# Patient Record
Sex: MALE | Race: WHITE | Employment: OTHER | URBAN - METROPOLITAN AREA
[De-identification: names, ages, dates, MRNs, and addresses within clinical notes are randomized per-mention and may not be internally consistent; named-entity substitution may affect disease eponyms.]

---

## 2017-02-23 ENCOUNTER — HOSPITAL ENCOUNTER (EMERGENCY)
Facility: HOSPITAL | Age: 62
Discharge: HOME | End: 2017-02-23
Attending: EMERGENCY MEDICINE
Payer: MEDICAID

## 2017-02-23 ENCOUNTER — APPOINTMENT (OUTPATIENT)
Dept: GENERAL RADIOLOGY | Facility: HOSPITAL | Age: 62
End: 2017-02-23
Attending: EMERGENCY MEDICINE
Payer: MEDICAID

## 2017-02-23 VITALS
SYSTOLIC BLOOD PRESSURE: 124 MMHG | RESPIRATION RATE: 18 BRPM | OXYGEN SATURATION: 100 % | TEMPERATURE: 98 F | DIASTOLIC BLOOD PRESSURE: 72 MMHG | HEART RATE: 65 BPM

## 2017-02-23 DIAGNOSIS — M70.51 BURSITIS OF KNEE, RIGHT: Primary | ICD-10-CM

## 2017-02-23 PROCEDURE — 73562 X-RAY EXAM OF KNEE 3: CPT

## 2017-02-23 PROCEDURE — 99283 EMERGENCY DEPT VISIT LOW MDM: CPT

## 2017-02-23 RX ORDER — TRAMADOL HYDROCHLORIDE 50 MG/1
TABLET ORAL EVERY 4 HOURS PRN
Qty: 20 TABLET | Refills: 0 | Status: SHIPPED | OUTPATIENT
Start: 2017-02-23 | End: 2017-03-02

## 2017-02-23 RX ORDER — HYDROCODONE BITARTRATE AND ACETAMINOPHEN 5; 325 MG/1; MG/1
2 TABLET ORAL ONCE
Status: COMPLETED | OUTPATIENT
Start: 2017-02-23 | End: 2017-02-23

## 2017-02-23 NOTE — ED PROVIDER NOTES
Patient Seen in: [facility] AND [facility] Emergency Department    History   Patient presents with:  Musculoskeletal Problem    Stated Complaint: right knee pain     HPI    49-year-old male with history of coronary artery disease presents with complaints of righ auscultation  Cardiac: regular rate and rhythm  Musculoskeletal: neck is supple and non tender         Small effusion noted to the right knee. No erythema or warmth noted. There is tenderness to palpation to the patella.   There is tenderness with flexion

## 2017-02-25 ENCOUNTER — HOSPITAL ENCOUNTER (EMERGENCY)
Facility: HOSPITAL | Age: 62
Discharge: HOME | End: 2017-02-25
Attending: EMERGENCY MEDICINE
Payer: MEDICAID

## 2017-02-25 VITALS
BODY MASS INDEX: 28.56 KG/M2 | TEMPERATURE: 99 F | RESPIRATION RATE: 20 BRPM | SYSTOLIC BLOOD PRESSURE: 113 MMHG | WEIGHT: 204 LBS | HEART RATE: 66 BPM | OXYGEN SATURATION: 97 % | DIASTOLIC BLOOD PRESSURE: 64 MMHG | HEIGHT: 71 IN

## 2017-02-25 DIAGNOSIS — M13.161 INFLAMMATION OF JOINT OF RIGHT KNEE: Primary | ICD-10-CM

## 2017-02-25 LAB
BASOPHILS NFR FLD: 0 %
EOSINOPHIL NFR FLD: 4 %
LYMPHOCYTES NFR FLD: 21 %
MONOCYTES NFR FLD: 20 %
NEUTROPHILS NFR FLD: 55 %
RBC # FLD: ABNORMAL /CUMM (ref ?–1)
WBC # FLD: 528 /CUMM

## 2017-02-25 PROCEDURE — 0S9C3ZZ DRAINAGE OF RIGHT KNEE JOINT, PERCUTANEOUS APPROACH: ICD-10-PCS | Performed by: EMERGENCY MEDICINE

## 2017-02-25 PROCEDURE — 87070 CULTURE OTHR SPECIMN AEROBIC: CPT | Performed by: EMERGENCY MEDICINE

## 2017-02-25 PROCEDURE — 20610 DRAIN/INJ JOINT/BURSA W/O US: CPT

## 2017-02-25 PROCEDURE — 87205 SMEAR GRAM STAIN: CPT | Performed by: EMERGENCY MEDICINE

## 2017-02-25 PROCEDURE — 99284 EMERGENCY DEPT VISIT MOD MDM: CPT

## 2017-02-25 PROCEDURE — 89050 BODY FLUID CELL COUNT: CPT | Performed by: EMERGENCY MEDICINE

## 2017-02-25 PROCEDURE — 89051 BODY FLUID CELL COUNT: CPT | Performed by: EMERGENCY MEDICINE

## 2017-02-25 PROCEDURE — 96372 THER/PROPH/DIAG INJ SC/IM: CPT

## 2017-02-25 RX ORDER — CEPHALEXIN 500 MG/1
500 CAPSULE ORAL 4 TIMES DAILY
Qty: 28 CAPSULE | Refills: 0 | Status: SHIPPED | OUTPATIENT
Start: 2017-02-25 | End: 2017-03-04

## 2017-02-25 RX ORDER — HYDROCODONE BITARTRATE AND ACETAMINOPHEN 5; 325 MG/1; MG/1
1-2 TABLET ORAL EVERY 6 HOURS PRN
Qty: 12 TABLET | Refills: 0 | Status: SHIPPED | OUTPATIENT
Start: 2017-02-25

## 2017-02-25 RX ORDER — KETOROLAC TROMETHAMINE 30 MG/ML
60 INJECTION, SOLUTION INTRAMUSCULAR; INTRAVENOUS ONCE
Status: COMPLETED | OUTPATIENT
Start: 2017-02-25 | End: 2017-02-25

## 2017-02-25 RX ORDER — MORPHINE SULFATE 4 MG/ML
4 INJECTION, SOLUTION INTRAMUSCULAR; INTRAVENOUS ONCE
Status: COMPLETED | OUTPATIENT
Start: 2017-02-25 | End: 2017-02-25

## 2017-02-25 RX ORDER — PREDNISONE 20 MG/1
60 TABLET ORAL ONCE
Status: COMPLETED | OUTPATIENT
Start: 2017-02-25 | End: 2017-02-25

## 2017-02-25 RX ORDER — PREDNISONE 20 MG/1
40 TABLET ORAL DAILY
Qty: 10 TABLET | Refills: 0 | Status: SHIPPED | OUTPATIENT
Start: 2017-02-25 | End: 2017-03-02

## 2017-02-25 RX ORDER — CEPHALEXIN 500 MG/1
500 CAPSULE ORAL ONCE
Status: COMPLETED | OUTPATIENT
Start: 2017-02-25 | End: 2017-02-25

## 2017-02-26 NOTE — ED INITIAL ASSESSMENT (HPI)
In this ED on Thursday for right knee pain. Dx with bursitis. Pain worse today.  Unable to bear weight

## 2017-02-26 NOTE — ED NOTES
Pt was in the ED on Thursday for R sided knee pain. States that he was diagnosed with bursitis and told to follow up with his PCP.  PCP is unable to see pt until Monday PM, but pt states that it is getting today worse, decreased mobility and increased swell

## 2017-02-26 NOTE — ED PROVIDER NOTES
Patient Seen in: [facility] AND [facility] Emergency Department    History   Patient presents with:  Pain (neurologic)      HPI    Patient presents complaining of right knee pain for the past 5 days.   He states that she was seen here several days ago and diagnos ED Triage Vitals   BP 02/25/17 1908 114/67 mmHg   Pulse 02/25/17 1908 66   Resp 02/25/17 1908 18   Temp 02/25/17 1908 98.7 °F (37.1 °C)   Temp src 02/25/17 1908 Oral   SpO2 02/25/17 1908 98 %   O2 Device 02/25/17 1908 None (Room air)       Physical E arthritis on synovial fluid analysis. Likely inflammatory process with possible anterior cellulitis. Will prescribe prednisone for anti-inflammation and discharge patient on Keflex in case of early infectious process.   Strongly encouraged orthopedic foll

## 2017-08-09 ENCOUNTER — MYAURORA ACCOUNT LINK (OUTPATIENT)
Dept: OTHER | Age: 62
End: 2017-08-09

## 2018-02-21 ENCOUNTER — MYAURORA ACCOUNT LINK (OUTPATIENT)
Dept: OTHER | Age: 63
End: 2018-02-21

## 2022-06-16 ENCOUNTER — APPOINTMENT (OUTPATIENT)
Dept: GENERAL RADIOLOGY | Age: 67
End: 2022-06-16
Payer: MEDICAID

## 2022-06-16 ENCOUNTER — HOSPITAL ENCOUNTER (OUTPATIENT)
Age: 67
Discharge: HOME | End: 2022-06-16
Payer: MEDICAID

## 2022-06-16 VITALS
OXYGEN SATURATION: 100 % | HEART RATE: 79 BPM | TEMPERATURE: 99 F | RESPIRATION RATE: 22 BRPM | BODY MASS INDEX: 27.3 KG/M2 | DIASTOLIC BLOOD PRESSURE: 70 MMHG | WEIGHT: 195 LBS | HEIGHT: 71 IN | SYSTOLIC BLOOD PRESSURE: 114 MMHG

## 2022-06-16 DIAGNOSIS — T14.90XA TRAUMA: Primary | ICD-10-CM

## 2022-06-16 DIAGNOSIS — L03.114 CELLULITIS OF LEFT UPPER EXTREMITY: ICD-10-CM

## 2022-06-16 DIAGNOSIS — M19.90 ARTHRITIS: ICD-10-CM

## 2022-06-16 PROCEDURE — 29105 APPLICATION LONG ARM SPLINT: CPT

## 2022-06-16 PROCEDURE — 73110 X-RAY EXAM OF WRIST: CPT

## 2022-06-16 PROCEDURE — 73130 X-RAY EXAM OF HAND: CPT

## 2022-06-16 PROCEDURE — 73090 X-RAY EXAM OF FOREARM: CPT

## 2022-06-16 PROCEDURE — 73080 X-RAY EXAM OF ELBOW: CPT

## 2022-06-16 PROCEDURE — 99204 OFFICE O/P NEW MOD 45 MIN: CPT

## 2022-06-16 RX ORDER — CEPHALEXIN 500 MG/1
500 CAPSULE ORAL 4 TIMES DAILY
Qty: 40 CAPSULE | Refills: 0 | Status: SHIPPED | OUTPATIENT
Start: 2022-06-16 | End: 2022-06-26

## 2022-06-16 RX ORDER — PANTOPRAZOLE SODIUM 40 MG/1
TABLET, DELAYED RELEASE ORAL
COMMUNITY
Start: 2022-03-01

## 2022-06-16 RX ORDER — DIGOXIN 125 MCG
TABLET ORAL
COMMUNITY
Start: 2022-05-27

## 2022-06-16 RX ORDER — SACUBITRIL AND VALSARTAN 97; 103 MG/1; MG/1
TABLET, FILM COATED ORAL
COMMUNITY
Start: 2022-06-01

## 2022-06-16 RX ORDER — EZETIMIBE 10 MG/1
TABLET ORAL
COMMUNITY
Start: 2022-05-06

## 2022-06-16 RX ORDER — CLOPIDOGREL BISULFATE 75 MG/1
1 TABLET ORAL DAILY
COMMUNITY
Start: 2021-11-24

## 2022-06-16 RX ORDER — CARVEDILOL 25 MG/1
TABLET ORAL
COMMUNITY
Start: 2022-05-27

## 2022-06-16 RX ORDER — SPIRONOLACTONE 25 MG/1
TABLET ORAL
COMMUNITY
Start: 2022-05-27

## 2022-06-16 RX ORDER — ASPIRIN 81 MG
TABLET,CHEWABLE ORAL
COMMUNITY
Start: 2022-03-26

## 2022-06-16 RX ORDER — FUROSEMIDE 40 MG/1
TABLET ORAL
COMMUNITY
Start: 2022-04-26

## 2022-06-16 RX ORDER — CEPHALEXIN 500 MG/1
500 CAPSULE ORAL 4 TIMES DAILY
Qty: 40 CAPSULE | Refills: 0 | Status: SHIPPED | OUTPATIENT
Start: 2022-06-16 | End: 2022-06-16

## 2022-06-16 NOTE — ED INITIAL ASSESSMENT (HPI)
PT STATES HE FELL 1 WEEK AGO AND INJURED LEFT HAND , PT HAS SWELLING TO LEFT HAND AND ARM.  STARTED 6/8/22

## 2022-08-18 ENCOUNTER — OFFICE VISIT (OUTPATIENT)
Dept: SURGERY | Facility: CLINIC | Age: 67
End: 2022-08-18
Payer: MEDICAID

## 2022-08-18 DIAGNOSIS — M19.041 PRIMARY OSTEOARTHRITIS OF RIGHT HAND: ICD-10-CM

## 2022-08-18 DIAGNOSIS — M19.042 PRIMARY OSTEOARTHRITIS OF LEFT HAND: ICD-10-CM

## 2022-08-18 DIAGNOSIS — M79.642 PAIN OF LEFT HAND: Primary | ICD-10-CM

## 2022-08-18 PROCEDURE — L3908 WHO COCK-UP NONMOLDE PRE OTS: HCPCS | Performed by: PLASTIC SURGERY

## 2022-08-18 PROCEDURE — 99204 OFFICE O/P NEW MOD 45 MIN: CPT | Performed by: PLASTIC SURGERY

## 2024-06-23 ENCOUNTER — APPOINTMENT (OUTPATIENT)
Dept: CT IMAGING | Facility: HOSPITAL | Age: 69
End: 2024-06-23
Attending: EMERGENCY MEDICINE

## 2024-06-23 ENCOUNTER — HOSPITAL ENCOUNTER (EMERGENCY)
Facility: HOSPITAL | Age: 69
Discharge: HOME | End: 2024-06-23
Attending: EMERGENCY MEDICINE

## 2024-06-23 ENCOUNTER — HOSPITAL ENCOUNTER (OUTPATIENT)
Age: 69
Discharge: ANOTHER HOSPITAL | End: 2024-06-23

## 2024-06-23 VITALS
HEART RATE: 78 BPM | SYSTOLIC BLOOD PRESSURE: 133 MMHG | BODY MASS INDEX: 28 KG/M2 | HEIGHT: 71 IN | DIASTOLIC BLOOD PRESSURE: 75 MMHG | RESPIRATION RATE: 18 BRPM | WEIGHT: 200 LBS | OXYGEN SATURATION: 98 % | TEMPERATURE: 100 F

## 2024-06-23 VITALS
DIASTOLIC BLOOD PRESSURE: 68 MMHG | SYSTOLIC BLOOD PRESSURE: 117 MMHG | RESPIRATION RATE: 20 BRPM | TEMPERATURE: 100 F | OXYGEN SATURATION: 98 % | HEART RATE: 87 BPM

## 2024-06-23 DIAGNOSIS — B02.30 HERPES ZOSTER WITH OPHTHALMIC COMPLICATION, UNSPECIFIED HERPES ZOSTER EYE DISEASE: ICD-10-CM

## 2024-06-23 DIAGNOSIS — H57.89 PERIORBITAL SWELLING: Primary | ICD-10-CM

## 2024-06-23 DIAGNOSIS — R21 FACIAL RASH: ICD-10-CM

## 2024-06-23 DIAGNOSIS — B02.30 HERPES ZOSTER OPHTHALMICUS OF LEFT EYE: Primary | ICD-10-CM

## 2024-06-23 DIAGNOSIS — H57.12 LEFT EYE PAIN: ICD-10-CM

## 2024-06-23 LAB
ANION GAP SERPL CALC-SCNC: 7 MMOL/L (ref 0–18)
BASOPHILS # BLD AUTO: 0.05 X10(3) UL (ref 0–0.2)
BASOPHILS NFR BLD AUTO: 0.7 %
BUN BLD-MCNC: 18 MG/DL (ref 9–23)
BUN/CREAT SERPL: 15.3 (ref 10–20)
CALCIUM BLD-MCNC: 8.9 MG/DL (ref 8.7–10.4)
CHLORIDE SERPL-SCNC: 107 MMOL/L (ref 98–112)
CO2 SERPL-SCNC: 25 MMOL/L (ref 21–32)
CREAT BLD-MCNC: 1.18 MG/DL
DEPRECATED RDW RBC AUTO: 42.6 FL (ref 35.1–46.3)
EGFRCR SERPLBLD CKD-EPI 2021: 67 ML/MIN/1.73M2 (ref 60–?)
EOSINOPHIL # BLD AUTO: 0.07 X10(3) UL (ref 0–0.7)
EOSINOPHIL NFR BLD AUTO: 0.9 %
ERYTHROCYTE [DISTWIDTH] IN BLOOD BY AUTOMATED COUNT: 13.1 % (ref 11–15)
GLUCOSE BLD-MCNC: 92 MG/DL (ref 70–99)
HCT VFR BLD AUTO: 43.8 %
HGB BLD-MCNC: 15.9 G/DL
IMM GRANULOCYTES # BLD AUTO: 0.03 X10(3) UL (ref 0–1)
IMM GRANULOCYTES NFR BLD: 0.4 %
LYMPHOCYTES # BLD AUTO: 1.57 X10(3) UL (ref 1–4)
LYMPHOCYTES NFR BLD AUTO: 20.9 %
MCH RBC QN AUTO: 32.4 PG (ref 26–34)
MCHC RBC AUTO-ENTMCNC: 36.3 G/DL (ref 31–37)
MCV RBC AUTO: 89.2 FL
MONOCYTES # BLD AUTO: 1.08 X10(3) UL (ref 0.1–1)
MONOCYTES NFR BLD AUTO: 14.4 %
NEUTROPHILS # BLD AUTO: 4.71 X10 (3) UL (ref 1.5–7.7)
NEUTROPHILS # BLD AUTO: 4.71 X10(3) UL (ref 1.5–7.7)
NEUTROPHILS NFR BLD AUTO: 62.7 %
OSMOLALITY SERPL CALC.SUM OF ELEC: 290 MOSM/KG (ref 275–295)
PLATELET # BLD AUTO: 234 10(3)UL (ref 150–450)
POTASSIUM SERPL-SCNC: 4.3 MMOL/L (ref 3.5–5.1)
RBC # BLD AUTO: 4.91 X10(6)UL
SODIUM SERPL-SCNC: 139 MMOL/L (ref 136–145)
WBC # BLD AUTO: 7.5 X10(3) UL (ref 4–11)

## 2024-06-23 PROCEDURE — 85025 COMPLETE CBC W/AUTO DIFF WBC: CPT | Performed by: EMERGENCY MEDICINE

## 2024-06-23 PROCEDURE — 80048 BASIC METABOLIC PNL TOTAL CA: CPT | Performed by: EMERGENCY MEDICINE

## 2024-06-23 PROCEDURE — 99285 EMERGENCY DEPT VISIT HI MDM: CPT

## 2024-06-23 PROCEDURE — 99284 EMERGENCY DEPT VISIT MOD MDM: CPT

## 2024-06-23 PROCEDURE — 96374 THER/PROPH/DIAG INJ IV PUSH: CPT

## 2024-06-23 PROCEDURE — 99215 OFFICE O/P EST HI 40 MIN: CPT | Performed by: NURSE PRACTITIONER

## 2024-06-23 PROCEDURE — 70481 CT ORBIT/EAR/FOSSA W/DYE: CPT | Performed by: EMERGENCY MEDICINE

## 2024-06-23 RX ORDER — ERYTHROMYCIN 5 MG/G
1 OINTMENT OPHTHALMIC EVERY 6 HOURS
Qty: 1 G | Refills: 0 | Status: SHIPPED | OUTPATIENT
Start: 2024-06-23 | End: 2024-06-28

## 2024-06-23 RX ORDER — VALACYCLOVIR HYDROCHLORIDE 500 MG/1
1000 TABLET, FILM COATED ORAL ONCE
Status: COMPLETED | OUTPATIENT
Start: 2024-06-23 | End: 2024-06-23

## 2024-06-23 RX ORDER — ERYTHROMYCIN 5 MG/G
1 OINTMENT OPHTHALMIC ONCE
Status: COMPLETED | OUTPATIENT
Start: 2024-06-23 | End: 2024-06-23

## 2024-06-23 RX ORDER — KETOROLAC TROMETHAMINE 15 MG/ML
15 INJECTION, SOLUTION INTRAMUSCULAR; INTRAVENOUS ONCE
Status: COMPLETED | OUTPATIENT
Start: 2024-06-23 | End: 2024-06-23

## 2024-06-23 RX ORDER — TETRACAINE HYDROCHLORIDE 5 MG/ML
1 SOLUTION OPHTHALMIC ONCE
Status: COMPLETED | OUTPATIENT
Start: 2024-06-23 | End: 2024-06-23

## 2024-06-23 RX ORDER — VALACYCLOVIR HYDROCHLORIDE 1 G/1
1000 TABLET, FILM COATED ORAL 3 TIMES DAILY
Qty: 21 TABLET | Refills: 0 | Status: SHIPPED | OUTPATIENT
Start: 2024-06-23 | End: 2024-06-28

## 2024-06-23 NOTE — ED INITIAL ASSESSMENT (HPI)
Pt presents from [facility] with shingles to left eye/left side of face  Pts eye is swollen, reports impaired/blurry vision d/t swelling  +weeping from lesions and eye

## 2024-06-23 NOTE — ED PROVIDER NOTES
[place] Emergency Department Note  Patient: Demetrio Rivera Age: 68 year old Sex: male      MRN: [id number]  : 1955    Patient Seen in: [facility] Emergency Department    History     Chief Complaint   Patient presents with    Shingles     Stated Complaint: periorbital swelling, shingles of the face, eye pain    History obtained from: patient     68M hx of prior MI, CHF, s/p pacemaker, here with left sided facial rash, sent after eval at  earlier today.  Patient states that he has been feeling fatigued for the past couple of days and developed a rash to the left side of his face/forehead that over the past 48 hours has began to involve his left eye.  He does report some blurred vision though no full vision loss.  No flashers or floaters.  Does report some eye drainage.  He does not wear contacts and only wears reading glasses.  He otherwise denies fevers or chills, vomiting, dizziness, chest pain, shortness of breath, or any other rashes.    Review of Systems:  Review of Systems  Positive for stated complaint: periorbital swelling, shingles of the face, eye pain. Constitutional and vital signs reviewed. All other systems reviewed and negative except as noted above.    Patient History:  Past Medical History:    Congestive heart disease (HCC)    Pacemaker       Past Surgical History:   Procedure Laterality Date    Pacemaker/defibrillator          No family history on file.    Specific Social Determinants of Health:   Social History     Socioeconomic History    Marital status:    Tobacco Use    Smoking status: Former    Smokeless tobacco: Never   Vaping Use    Vaping status: Never Used   Substance and Sexual Activity    Alcohol use: Never    Drug use: Never   Other Topics Concern    Right Handed Yes           PSFH elements reviewed from today and agreed except as otherwise stated in HPI.    Physical Exam     ED Triage Vitals [24 1423]   /90   Pulse 84   Resp 19   Temp 99.7 °F (37.6  °C)   Temp src    SpO2 97 %   O2 Device None (Room air)       Current:/90   Pulse 84   Temp 99.7 °F (37.6 °C)   Resp 19   Ht 180.3 cm (5' 11\")   Wt 90.7 kg   SpO2 97%   BMI 27.89 kg/m²         Physical Exam  Vitals and nursing note reviewed.   HENT:      Head: Normocephalic and atraumatic.      Right Ear: Tympanic membrane normal.      Left Ear: Tympanic membrane normal.      Nose: Nose normal.      Mouth/Throat:      Mouth: Mucous membranes are moist.   Eyes:      Extraocular Movements: Extraocular movements intact.      Pupils: Pupils are equal, round, and reactive to light.      Comments: Left conjunctiva is inflamed/erythematous, on fluorescein stain no obvious dendritic lesions or corneal abrasions, there is yellowish drainage from the left eye.  See skin exam.  No proptosis, hyphema, or hypopyon.   Cardiovascular:      Rate and Rhythm: Normal rate and regular rhythm.      Heart sounds: No murmur heard.  Pulmonary:      Effort: Pulmonary effort is normal. No respiratory distress.      Breath sounds: No wheezing or rales.   Abdominal:      General: There is no distension.      Palpations: Abdomen is soft.      Tenderness: There is no abdominal tenderness. There is no guarding or rebound.   Musculoskeletal:         General: No deformity.   Skin:     General: Skin is warm and dry.      Capillary Refill: Capillary refill takes less than 2 seconds.      Findings: Rash present.      Comments: Vesicular appearing rash in the V1 distribution of the left side of the face, see image below    Neurological:      General: No focal deficit present.      Mental Status: He is alert.      Cranial Nerves: No cranial nerve deficit.         ED Course   Labs:   Labs Reviewed   CBC W/ DIFFERENTIAL - Abnormal; Notable for the following components:       Result Value    Monocyte Absolute 1.08 (*)     All other components within normal limits   BASIC METABOLIC PANEL (8) - Normal   CBC WITH DIFFERENTIAL WITH PLATELET     Narrative:     The following orders were created for panel order CBC With Differential With Platelet.  Procedure                               Abnormality         Status                     ---------                               -----------         ------                     CBC W/ DIFFERENTIAL[[id number]]          Abnormal            Final result                 Please view results for these tests on the individual orders.     Radiology findings:  I personally reviewed the images.   CT ORBITS(CONTRAST ONLY) (CPT=70481)    Result Date: 6/23/2024  CONCLUSION:   Left malar and left periorbital soft tissue swelling.  No abscess.    Dictated by (CST): Greyson Figueroa MD on 6/23/2024 at 4:23 PM     Finalized by (CST): Greyson Figueroa MD on 6/23/2024 at 4:31 PM             Cardiac Monitor: Interpreted by me.   Pulse Readings from Last 1 Encounters:   06/23/24 84   , sinus,       MDM   This patient presents with left sided facial swelling, eye pain in setting of rash, clinically pt with shingles in V1 distribution on my exam. He does not have evidence of other disseminated rash, he has no meningismus or altered mental status, and cardiopulmonary exam is unremarkable. My fluorescein stain does not reveal discrete dendritic lesions though does have obvious periorbital swelling and conjunctival injection concerning for ophthalmic involvement.     Differential diagnoses considered includes, but is not limited to:   Orbital cellulitis  Herpes zoster ophthalmicus   Orbital abscess less likely   Low suspicion disseminated zoster at this time     Will obtain the following tests: CT head w contrast, cbc, bmp  Will administer the following medications/therapies: toradol for pain     Please see ED course for my independent review of these tests/imaging results.    Chronic conditions affecting care: CHF, pacemaker     Case discussed with Dr. Natarajan with ophthalmology recommends oral antivirals and topical ophthalmic antibiotic such as cipro  drops or erythromycin drops. Agrees with CT orbits due to vision changes to eval for orbital cellulitis or other complication.     ED Course as of 06/23/24 2242  ------------------------------------------------------------  Time: 06/23 1512  Comment: Cbc no leukocytosis or left shift.   ------------------------------------------------------------  Time: 06/23 1538  Comment: Bmp unremarkable   ------------------------------------------------------------  Time: 06/23 1637  Value: CT ORBITS(CONTRAST ONLY) (CPT=70481)  Comment: CONCLUSION:      Left malar and left periorbital soft tissue swelling.      No abscess.         ------------------------------------------------------------  Time: 06/23 1653  Comment: Patient reassessed and updated with results and ophthalmology recommendations.  Counseled him that he needs to follow-up within 48 hours.  Counseled him extensively with any new systemic signs or symptoms he needs to return immediately to the ER particularly if fevers, confusion, or any diffuse rash that would be concerning for disseminated zoster.  He verbalized understanding and is comfortable with the discharge plan.  Will give first dose of Valtrex prior to discharge.            Procedures:  Procedures      Disposition and Plan     Clinical Impression:  1. Herpes zoster ophthalmicus of left eye    2. Herpes zoster with ophthalmic complication, unspecified herpes zoster eye disease        Disposition:  Discharge    Follow-up:  Yariel Natarajan MD  [facility] [address] [facility] [address]  [facility] [id number] [phone]    Schedule an appointment as soon as possible for a visit in 2 day(s)      [facility] Emergency Department  [address]  [facility] [address]  [phone]  Go to  If symptoms worsen, immediately    Quoc Pandya MD  [address] [place] [address] [address]  [facility] [address]  [phone]    Schedule an appointment as soon as possible for a visit in 2 day(s)  As needed otherwise follow up with  your own primary doctor in 2 days      Medications Prescribed:  Current Discharge Medication List        START taking these medications    Details   valACYclovir 1 G Oral Tab Take 1 tablet (1,000 mg total) by mouth 3 (three) times daily for 7 days.  Qty: 21 tablet, Refills: 0      erythromycin 5 MG/GM Ophthalmic Ointment Apply 1 Application to eye every 6 (six) hours for 7 days.  Qty: 1 g, Refills: 0               This note may have been created using voice dictation technology and may include inadvertent errors.      Katie Carnes, DO  Attending Physician   Emergency Medicine

## 2024-06-23 NOTE — ED PROVIDER NOTES
Patient Seen in: [facility]      History     Chief Complaint   Patient presents with    Eyelid Swelling     Stated Complaint: Head issue    Subjective:   HPI    68 yr old male with hx STEMI, CHF, cardiac disease with pacemaker, here for evaluation of left sided facial rash that started Friday. He reports yesterday worsening itchy, rash that is spreading and left periorbital swelling. He reports vision change to left eye and eye pain as well. Denies falls or injury, new soaps, detergents, facial washes, shampoos etc. Denies any surgery to eyes in past. Does not wear glasses or contacts. Has not had something like this before.    Objective:   Past Medical History:    Congestive heart disease (HCC)    Pacemaker              Past Surgical History:   Procedure Laterality Date    Pacemaker/defibrillator                  Social History     Socioeconomic History    Marital status:    Tobacco Use    Smoking status: Former    Smokeless tobacco: Never   Vaping Use    Vaping status: Never Used   Substance and Sexual Activity    Alcohol use: Never    Drug use: Never   Other Topics Concern    Right Handed Yes              Review of Systems    Positive for stated complaint: Head issue  Other systems are as noted in HPI.  Constitutional and vital signs reviewed.      All other systems reviewed and negative except as noted above.    Physical Exam     ED Triage Vitals [06/23/24 1327]   /68   Pulse 87   Resp 20   Temp 100.2 °F (37.9 °C)   Temp src Temporal   SpO2 98 %   O2 Device None (Room air)       Current Vitals:   Vital Signs  BP: 117/68  Pulse: 87  Resp: 20  Temp: 100.2 °F (37.9 °C)  Temp src: Temporal    Oxygen Therapy  SpO2: 98 %  O2 Device: None (Room air)            Physical Exam  HENT:      Head: Normocephalic. Left periorbital erythema present. No raccoon eyes, Mustafa's sign or right periorbital erythema.      Jaw: There is normal jaw occlusion.        Comments: Rash to left forehead, eyebrow  and periorbital left side only; eyelid swelling, redness, unable to open eyelid     Nose: Nose normal.      Mouth/Throat:      Lips: Pink.      Pharynx: Oropharynx is clear. Uvula midline.   Eyes:      General: Visual field deficit present.         Right eye: No discharge.         Left eye: Discharge present.     Conjunctiva/sclera:      Left eye: Left conjunctiva is injected. Chemosis present. No hemorrhage.              ED Course   Labs Reviewed - No data to display                   MDM     68 yr old male here for left sided facial rash that started Friday, worsening over the last 24 hours with facial swelling, eye swelling, eye pain, vision changes.    ON exam, pt with significant rash to left side of face: forehead, periorbital and into maxillary area. Full ROM to jaw. Very difficult to open left eyelid due to swelling, left sclera injected. Painful to open. Tender surrounding eye. Right side of face, eye is normal. No vision change. No rash elsewhere on body.    Differential diagnoses reflecting the complexity of care include but are not limited to herpes zoster ophthalmicus, facial rash, periorbital cellulitis.    Comorbidities that add complexity to management include: CHF, STEMI, CAD, pacemaker  History obtained by an independent source was from: patient  My independent interpretations of studies include: none performed  Shared decision making was done by: patient myself  Discussions of management was done with: Dr Vu, attending at [facility].    Patient is non-toxic and in no acute distress.  Vital signs are stable. Due to medical emergency of this presentation will need further evaluation in ER setting.  Given the limitations of the immediate care and the likely need for advanced lab testing and/or imaging not available here, and/or consult of specialist, the patient will be sent to the emergency department for further evaluation and management.                                     Medical Decision  Making      Disposition and Plan     Clinical Impression:  1. Periorbital swelling    2. Left eye pain    3. Facial rash         Disposition:  Ic to ed  6/23/2024  1:37 pm    Follow-up:  No follow-up provider specified.        Medications Prescribed:  Current Discharge Medication List

## 2024-06-23 NOTE — DISCHARGE INSTRUCTIONS
Thank you for seeking care at [facility] Emergency Department.    You have been seen and evaluated for your rash.  Your rash is consistent with an infection called herpes zoster or shingles.  It is involving your eye.  It is very important that you follow-up with the eye doctor within 48 hours.  Please call tomorrow to schedule follow-up within 48 hours.  Please take the antivirals as prescribed.  Please apply the eye ointment 4 times daily for the next week.   We reviewed the results from your visit in the emergency department.   Please read the instructions provided.   If given prescriptions, take as instructed.     Remember, your care process does not end after your visit today. Please follow-up with your doctor within 1-2 days for a follow-up check to ensure you are improving, to see if you need any further evaluation/testing, or to evaluate for any alternate diagnoses. You may need to follow up with dermatology in the future if your rash does not improve and your primary care provider can help arrange that referral.     Please return to the emergency department if you develop rash that becomes increasingly painful, starts peeling or blistering, starts to drain pus, if you develop lesions in your mouth, if you develop fevers, difficulty breathing, vomiting, diarrhea or if you develop any other new or concerning symptoms as these could be signs of more serious medical illness.    We hope you feel better.

## 2024-06-27 ENCOUNTER — OFFICE VISIT (OUTPATIENT)
Dept: INTERNAL MEDICINE CLINIC | Facility: CLINIC | Age: 69
End: 2024-06-27

## 2024-06-27 ENCOUNTER — LAB ENCOUNTER (OUTPATIENT)
Dept: LAB | Age: 69
End: 2024-06-27
Attending: STUDENT IN AN ORGANIZED HEALTH CARE EDUCATION/TRAINING PROGRAM
Payer: MEDICAID

## 2024-06-27 ENCOUNTER — TELEPHONE (OUTPATIENT)
Dept: INTERNAL MEDICINE CLINIC | Facility: CLINIC | Age: 69
End: 2024-06-27

## 2024-06-27 VITALS
TEMPERATURE: 98 F | HEIGHT: 71 IN | SYSTOLIC BLOOD PRESSURE: 103 MMHG | WEIGHT: 196 LBS | HEART RATE: 80 BPM | DIASTOLIC BLOOD PRESSURE: 67 MMHG | BODY MASS INDEX: 27.44 KG/M2

## 2024-06-27 DIAGNOSIS — Z95.810 S/P IMPLANTATION OF AUTOMATIC CARDIOVERTER/DEFIBRILLATOR (AICD): ICD-10-CM

## 2024-06-27 DIAGNOSIS — N52.9 ERECTILE DYSFUNCTION, UNSPECIFIED ERECTILE DYSFUNCTION TYPE: ICD-10-CM

## 2024-06-27 DIAGNOSIS — B02.30 HERPES ZOSTER WITH OPHTHALMIC COMPLICATION, UNSPECIFIED HERPES ZOSTER EYE DISEASE: ICD-10-CM

## 2024-06-27 DIAGNOSIS — K62.5 RECTAL BLEEDING: ICD-10-CM

## 2024-06-27 DIAGNOSIS — M25.50 POLYARTHRALGIA: ICD-10-CM

## 2024-06-27 DIAGNOSIS — I50.42 CHRONIC COMBINED SYSTOLIC AND DIASTOLIC HEART FAILURE (HCC): ICD-10-CM

## 2024-06-27 DIAGNOSIS — I25.5 ISCHEMIC CARDIOMYOPATHY: ICD-10-CM

## 2024-06-27 DIAGNOSIS — Z87.891 FORMER HEAVY TOBACCO SMOKER: ICD-10-CM

## 2024-06-27 DIAGNOSIS — E55.9 VITAMIN D DEFICIENCY: ICD-10-CM

## 2024-06-27 DIAGNOSIS — Z78.9 STATIN INTOLERANCE: ICD-10-CM

## 2024-06-27 DIAGNOSIS — M25.511 CHRONIC RIGHT SHOULDER PAIN: ICD-10-CM

## 2024-06-27 DIAGNOSIS — G89.29 CHRONIC RIGHT SHOULDER PAIN: ICD-10-CM

## 2024-06-27 DIAGNOSIS — Z00.00 ANNUAL PHYSICAL EXAM: Primary | ICD-10-CM

## 2024-06-27 DIAGNOSIS — L57.0 ACTINIC KERATOSIS DUE TO EXPOSURE TO SUNLIGHT: ICD-10-CM

## 2024-06-27 DIAGNOSIS — Z00.00 ANNUAL PHYSICAL EXAM: ICD-10-CM

## 2024-06-27 DIAGNOSIS — Z95.5 STATUS POST INSERTION OF DRUG ELUTING CORONARY ARTERY STENT: ICD-10-CM

## 2024-06-27 DIAGNOSIS — Z12.11 ENCOUNTER FOR SCREENING COLONOSCOPY: ICD-10-CM

## 2024-06-27 LAB
ALBUMIN SERPL-MCNC: 4.3 G/DL (ref 3.2–4.8)
ALBUMIN/GLOB SERPL: 1.5 {RATIO} (ref 1–2)
ALP LIVER SERPL-CCNC: 77 U/L
ALT SERPL-CCNC: 13 U/L
ANION GAP SERPL CALC-SCNC: 7 MMOL/L (ref 0–18)
AST SERPL-CCNC: 17 U/L (ref ?–34)
BASOPHILS # BLD AUTO: 0.05 X10(3) UL (ref 0–0.2)
BASOPHILS NFR BLD AUTO: 0.7 %
BILIRUB SERPL-MCNC: 0.5 MG/DL (ref 0.2–1.1)
BUN BLD-MCNC: 26 MG/DL (ref 9–23)
BUN/CREAT SERPL: 18.7 (ref 10–20)
CALCIUM BLD-MCNC: 9.3 MG/DL (ref 8.7–10.4)
CHLORIDE SERPL-SCNC: 111 MMOL/L (ref 98–112)
CHOLEST SERPL-MCNC: 182 MG/DL (ref ?–200)
CO2 SERPL-SCNC: 26 MMOL/L (ref 21–32)
CREAT BLD-MCNC: 1.39 MG/DL
CRP SERPL-MCNC: <0.4 MG/DL (ref ?–1)
DEPRECATED RDW RBC AUTO: 42.5 FL (ref 35.1–46.3)
EGFRCR SERPLBLD CKD-EPI 2021: 55 ML/MIN/1.73M2 (ref 60–?)
EOSINOPHIL # BLD AUTO: 0.19 X10(3) UL (ref 0–0.7)
EOSINOPHIL NFR BLD AUTO: 2.8 %
ERYTHROCYTE [DISTWIDTH] IN BLOOD BY AUTOMATED COUNT: 12.9 % (ref 11–15)
ERYTHROCYTE [SEDIMENTATION RATE] IN BLOOD: 12 MM/HR
EST. AVERAGE GLUCOSE BLD GHB EST-MCNC: 120 MG/DL (ref 68–126)
FASTING PATIENT LIPID ANSWER: NO
FASTING STATUS PATIENT QL REPORTED: NO
GLOBULIN PLAS-MCNC: 2.9 G/DL (ref 2–3.5)
GLUCOSE BLD-MCNC: 66 MG/DL (ref 70–99)
HBA1C MFR BLD: 5.8 % (ref ?–5.7)
HCT VFR BLD AUTO: 42.1 %
HDLC SERPL-MCNC: 27 MG/DL (ref 40–59)
HGB BLD-MCNC: 14.8 G/DL
IMM GRANULOCYTES # BLD AUTO: 0.02 X10(3) UL (ref 0–1)
IMM GRANULOCYTES NFR BLD: 0.3 %
LDLC SERPL CALC-MCNC: 121 MG/DL (ref ?–100)
LYMPHOCYTES # BLD AUTO: 2.57 X10(3) UL (ref 1–4)
LYMPHOCYTES NFR BLD AUTO: 38 %
MCH RBC QN AUTO: 31.8 PG (ref 26–34)
MCHC RBC AUTO-ENTMCNC: 35.2 G/DL (ref 31–37)
MCV RBC AUTO: 90.3 FL
MONOCYTES # BLD AUTO: 0.68 X10(3) UL (ref 0.1–1)
MONOCYTES NFR BLD AUTO: 10.1 %
NEUTROPHILS # BLD AUTO: 3.25 X10 (3) UL (ref 1.5–7.7)
NEUTROPHILS # BLD AUTO: 3.25 X10(3) UL (ref 1.5–7.7)
NEUTROPHILS NFR BLD AUTO: 48.1 %
NONHDLC SERPL-MCNC: 155 MG/DL (ref ?–130)
OSMOLALITY SERPL CALC.SUM OF ELEC: 301 MOSM/KG (ref 275–295)
PLATELET # BLD AUTO: 258 10(3)UL (ref 150–450)
POTASSIUM SERPL-SCNC: 4.5 MMOL/L (ref 3.5–5.1)
PROT SERPL-MCNC: 7.2 G/DL (ref 5.7–8.2)
PSA SERPL-MCNC: 0.84 NG/ML (ref ?–4)
RBC # BLD AUTO: 4.66 X10(6)UL
SODIUM SERPL-SCNC: 144 MMOL/L (ref 136–145)
TRIGL SERPL-MCNC: 193 MG/DL (ref 30–149)
TSI SER-ACNC: 1.93 MIU/ML (ref 0.55–4.78)
URATE SERPL-MCNC: 8.2 MG/DL
VIT D+METAB SERPL-MCNC: 26.3 NG/ML (ref 30–100)
VLDLC SERPL CALC-MCNC: 34 MG/DL (ref 0–30)
WBC # BLD AUTO: 6.8 X10(3) UL (ref 4–11)

## 2024-06-27 PROCEDURE — 82306 VITAMIN D 25 HYDROXY: CPT

## 2024-06-27 PROCEDURE — 84153 ASSAY OF PSA TOTAL: CPT

## 2024-06-27 PROCEDURE — 84550 ASSAY OF BLOOD/URIC ACID: CPT

## 2024-06-27 PROCEDURE — 85025 COMPLETE CBC W/AUTO DIFF WBC: CPT

## 2024-06-27 PROCEDURE — 84443 ASSAY THYROID STIM HORMONE: CPT

## 2024-06-27 PROCEDURE — 36415 COLL VENOUS BLD VENIPUNCTURE: CPT

## 2024-06-27 PROCEDURE — 85652 RBC SED RATE AUTOMATED: CPT

## 2024-06-27 PROCEDURE — 80053 COMPREHEN METABOLIC PANEL: CPT

## 2024-06-27 PROCEDURE — 99387 INIT PM E/M NEW PAT 65+ YRS: CPT | Performed by: STUDENT IN AN ORGANIZED HEALTH CARE EDUCATION/TRAINING PROGRAM

## 2024-06-27 PROCEDURE — 80061 LIPID PANEL: CPT

## 2024-06-27 PROCEDURE — 83036 HEMOGLOBIN GLYCOSYLATED A1C: CPT

## 2024-06-27 PROCEDURE — 86140 C-REACTIVE PROTEIN: CPT

## 2024-06-27 RX ORDER — SILDENAFIL 50 MG/1
50 TABLET, FILM COATED ORAL
Qty: 30 TABLET | Refills: 0 | Status: SHIPPED | OUTPATIENT
Start: 2024-06-27 | End: 2024-07-27

## 2024-06-27 NOTE — TELEPHONE ENCOUNTER
Spoke with spouse lara federico [facility].    Demetrio was not available but let lara know dr hernandez did place a dermatology referral for him if needed and gave office number to call to make an appointment.  Patient does not have mychart but advised to have patient call office if he has any questions

## 2024-06-28 ENCOUNTER — TELEPHONE (OUTPATIENT)
Dept: INTERNAL MEDICINE CLINIC | Facility: CLINIC | Age: 69
End: 2024-06-28

## 2024-06-28 DIAGNOSIS — B02.30 HERPES ZOSTER WITH OPHTHALMIC COMPLICATION, UNSPECIFIED HERPES ZOSTER EYE DISEASE: Primary | ICD-10-CM

## 2024-06-28 RX ORDER — VALACYCLOVIR HYDROCHLORIDE 1 G/1
1000 TABLET, FILM COATED ORAL 3 TIMES DAILY
Qty: 21 TABLET | Refills: 0 | Status: SHIPPED | OUTPATIENT
Start: 2024-06-28 | End: 2024-07-05

## 2024-06-28 RX ORDER — ERYTHROMYCIN 5 MG/G
1 OINTMENT OPHTHALMIC EVERY 6 HOURS
Qty: 1 G | Refills: 0 | Status: SHIPPED | OUTPATIENT
Start: 2024-06-28 | End: 2024-07-05

## 2024-06-28 NOTE — TELEPHONE ENCOUNTER
Patient is requesting refills for erythromycin 5 MG/GM Ophthalmic Ointment and valACYclovir 1 G Oral Tab     Will be out of medication soon     [facility] [id number] - [place], [place] - [address] [place] [address] [phone], [phone]

## 2024-06-28 NOTE — TELEPHONE ENCOUNTER
patient called requesting a refill for the 2 medication below. Patient stated that he has shingles and he was in to see you yesterday. Patient stated that he thought he had refills on file but he does not. Patient will like to get a refill for both medications. Per patient he did see Dr. Natarajan the eye doctor and he even told him he will need to continue the medication for 2 weeks and patient thought he sent it in but there is nothing at the pharmacy. Please advise. Patient aware you will be in the office tomorrow.

## 2024-06-28 NOTE — TELEPHONE ENCOUNTER
valACYclovir HCl     Dispensed Written Strength Quantity Refills Days Supply Provider Pharmacy   VALACYCLOVIR HCL 1 GRAM TABLET 06/23/2024 06/23/2024 1000 MG 21 tablet  7 Katie Carnes DO [facility]...          Erythromycin     Dispensed Written Strength Quantity Refills Days Supply Provider Pharmacy   ERYTHROMYCIN 0.5% EYE OINTMENT 06/23/2024 06/23/2024 5 MG/GRAM 3 Undefined  7 Katie Carnes DO [facility].        Not prescribed by us, looks like there was new prescription at [facility]'s pharmacy. Both written for 7 day treatment.    Dr. Pandya - were you going to write for more?

## 2024-06-28 NOTE — TELEPHONE ENCOUNTER
Body Location Override (Optional - Billing Will Still Be Based On Selected Body Map Location If Applicable): right distal radial dorsal forearm Just sent medications, valtrex and erythromycin eye drops to pharmacy ([facility] in [place]). Please relay to pt Detail Level: Detailed Biopsy Photograph Reviewed: Yes Accession # (Optional): [id number] Number Of Curettages: 3 Size Of Lesion In Cm: 1.3 Size Of Lesion After Curettage: 1.7 Add Intralesional Injection: No Concentration (Mg/Ml Or Millions Of Plaque Forming Units/Cc): 0.01 Total Volume (Ccs): 1 Anesthesia Type: 1% lidocaine with epinephrine and a 1:10 solution of 8.4% sodium bicarbonate Cautery Type: electrodesiccation What Was Performed First?: Curettage Final Size Statement: The size of the lesion after curettage was Additional Information: (Optional): The wound was cleaned, and a pressure dressing was applied.  The patient received detailed post-op instructions. Consent was obtained from the patient. The risks, benefits and alternatives to therapy were discussed in detail. Specifically, the risks of infection, scarring, bleeding, prolonged wound healing, nerve injury, incomplete removal, allergy to anesthesia and recurrence were addressed. Alternatives to ED&C, such as: surgical removal was also discussed.  Prior to the procedure, the treatment site was clearly identified and confirmed by the patient. All components of Universal Protocol/PAUSE Rule completed. Post-Care Instructions: I reviewed with the patient in detail post-care instructions. Patient is to keep the area dry for 48 hours, and not to engage in any swimming until the area is healed. Should the patient develop any fevers, chills, bleeding, severe pain patient will contact the office immediately. Bill As A Line Item Or As Units: Line Item

## 2024-06-28 NOTE — TELEPHONE ENCOUNTER
Left message for patient to call back and discuss at home number. Cell number on verbal release is incorrect.

## 2024-06-28 NOTE — TELEPHONE ENCOUNTER
Just sent medications, valtrex and erythromycin eye drops to pharmacy ([facility] in [place]). Please relay to pt

## 2024-06-29 DIAGNOSIS — M1A.09X0 CHRONIC GOUT OF MULTIPLE SITES, UNSPECIFIED CAUSE: Primary | ICD-10-CM

## 2024-06-29 NOTE — TELEPHONE ENCOUNTER
Patient states he is picking up the antibiotics now. He does not want another number listed, only the  [phone].

## 2024-06-29 NOTE — PROGRESS NOTES
Please relay to patient:  Hernan MIDDLETON    after review of your labs here are your recommendations:    # Lipids/cholesterol: Your ASCVD, ie 10-year risk score which indicates the potential chance that you could have a heart attack, stroke or death related to cholesterol/heart disease, is GREATER than the 5% cut off. Your is 15% and your LDL is elevated (bad cholesterol) and Triglycerides also elevated.> I understand you were unable to tolerate statins in the past and why on zetia. Please discuss with your cardiologist if you might be a candidate for Repatha PCSK9 inhibitor.      The 10-year ASCVD risk score (Jada BEACH, et al., 2019) is: 15%    Values used to calculate the score:      Age: 69 years      Sex: Male      Is Non- : No      Diabetic: No      Tobacco smoker: No      Systolic Blood Pressure: 103 mmHg      Is BP treated: No      HDL Cholesterol: 27 mg/dL      Total Cholesterol: 182 mg/dL      # CMP: Your comprehensive metabolic panel shows overall decreased renal function. Will need to repeat blood work next week to see why renal function is slightly impaired.   Your , liver (AST, ALT, Bilirubin), and electrolytes (sodium, potassium, calcium). Slight variations in other values such as BUN/Creat, Serum Osm, anion gap, chloride, etc are not of clinical value at this time.     # CBC: Your complete blood count shows overall stable red blood cells, white blood cells, platelets (help you stop bleeding), and hematocrit (thickness of blood),  Slight variations in other values such as RDW/sw, MCH are not of clinical value at this time.     # TSH: Your thyroid (TSH) function is normal. TSH 1.92    # Vitamins: Your vitamin D level is Vitamin D Insufficiency (<30ng/mL) please start 2,000 International Units daily will recheck with next annual physical or sooner if clinically indicated    # Diabetes/A1C: Your A1C Last A1c value was 5.8% done 6/27/2024. which shows  prediabetes. This does not require  medications unless your A1C reaches greater than 6.5, but if you would like to start medications to prevent the progression to diabetes please let me know. I would recommend increasing exercise and/or reducing your intake of carbohydrates, pastas, sweets, and sugary drinks/etc, drink more water, eat more vegetables instead of fruit, and try to lose 10% of your current bodyweight.  We will recheck your A1C in 3 months, please schedule a follow up office visit so we can recheck your A1C in the office using a finger stick test. You do not need to fast. If you prefer a video visit let me know so we can order a lab draw A1C/metabolic panel to be completed 3 days prior to our visit.     # Prostate: Your prostate level, ie PSA, is normal. 0.84    #Inflammatory markers: normal CRP/ESR (negative inflammatary markers). Uric acid level is 8.2 goal is below, 6 is our goal. How to lower uric acid levels naturally and manage gout  Limit purine-rich foods.  Eat low-purine foods.  Avoid certain medications.  Maintain a moderate weight.  Avoid alcohol and sugary drinks.  Drink coffee.  Increase vitamin C intake.  Eat cherries.    If you have any questions or concerns in regards to these labs we can discuss further with your next follow up appointment.  -Dr. Pandya

## 2024-07-01 DIAGNOSIS — N17.9 AKI (ACUTE KIDNEY INJURY) (HCC): Primary | ICD-10-CM

## 2024-07-02 ENCOUNTER — TELEPHONE (OUTPATIENT)
Dept: CASE MANAGEMENT | Age: 69
End: 2024-07-02

## 2024-07-02 DIAGNOSIS — B02.30 HERPES ZOSTER WITH OPHTHALMIC COMPLICATION, UNSPECIFIED HERPES ZOSTER EYE DISEASE: ICD-10-CM

## 2024-07-02 DIAGNOSIS — B02.30 HERPES ZOSTER OPHTHALMICUS OF LEFT EYE: Primary | ICD-10-CM

## 2024-07-02 NOTE — TELEPHONE ENCOUNTER
Dr. Pandya,     Patient requesting referral to Dr. Rader, Post ER visit.    Patient has Medicaid and this is Order Only.     Please have staff fax order to specialist.     Pended referral please review diagnosis and sign off if you agree.    Thank you.  Rin Herman  [facility]

## 2024-07-08 ENCOUNTER — LAB ENCOUNTER (OUTPATIENT)
Dept: LAB | Facility: HOSPITAL | Age: 69
End: 2024-07-08
Attending: STUDENT IN AN ORGANIZED HEALTH CARE EDUCATION/TRAINING PROGRAM
Payer: MEDICAID

## 2024-07-08 DIAGNOSIS — N17.9 AKI (ACUTE KIDNEY INJURY) (HCC): ICD-10-CM

## 2024-07-08 LAB
ALBUMIN SERPL-MCNC: 4.3 G/DL (ref 3.2–4.8)
ALBUMIN/GLOB SERPL: 1.6 {RATIO} (ref 1–2)
ALP LIVER SERPL-CCNC: 71 U/L
ALT SERPL-CCNC: 10 U/L
ANION GAP SERPL CALC-SCNC: 9 MMOL/L (ref 0–18)
AST SERPL-CCNC: 17 U/L (ref ?–34)
BILIRUB SERPL-MCNC: 0.6 MG/DL (ref 0.2–1.1)
BUN BLD-MCNC: 31 MG/DL (ref 9–23)
BUN/CREAT SERPL: 21.4 (ref 10–20)
CALCIUM BLD-MCNC: 9 MG/DL (ref 8.7–10.4)
CHLORIDE SERPL-SCNC: 112 MMOL/L (ref 98–112)
CO2 SERPL-SCNC: 22 MMOL/L (ref 21–32)
CREAT BLD-MCNC: 1.45 MG/DL
EGFRCR SERPLBLD CKD-EPI 2021: 52 ML/MIN/1.73M2 (ref 60–?)
FASTING STATUS PATIENT QL REPORTED: NO
GLOBULIN PLAS-MCNC: 2.7 G/DL (ref 2–3.5)
GLUCOSE BLD-MCNC: 98 MG/DL (ref 70–99)
OSMOLALITY SERPL CALC.SUM OF ELEC: 303 MOSM/KG (ref 275–295)
POTASSIUM SERPL-SCNC: 3.7 MMOL/L (ref 3.5–5.1)
PROT SERPL-MCNC: 7 G/DL (ref 5.7–8.2)
SODIUM SERPL-SCNC: 143 MMOL/L (ref 136–145)

## 2024-07-08 PROCEDURE — 36415 COLL VENOUS BLD VENIPUNCTURE: CPT

## 2024-07-08 PROCEDURE — 80053 COMPREHEN METABOLIC PANEL: CPT

## 2024-07-09 ENCOUNTER — NURSE TRIAGE (OUTPATIENT)
Dept: INTERNAL MEDICINE CLINIC | Facility: CLINIC | Age: 69
End: 2024-07-09

## 2024-07-09 ENCOUNTER — HOSPITAL ENCOUNTER (EMERGENCY)
Facility: HOSPITAL | Age: 69
Discharge: HOME | End: 2024-07-09
Attending: EMERGENCY MEDICINE
Payer: MEDICAID

## 2024-07-09 VITALS
BODY MASS INDEX: 27.3 KG/M2 | OXYGEN SATURATION: 100 % | TEMPERATURE: 98 F | WEIGHT: 195 LBS | RESPIRATION RATE: 16 BRPM | DIASTOLIC BLOOD PRESSURE: 79 MMHG | HEART RATE: 82 BPM | HEIGHT: 71 IN | SYSTOLIC BLOOD PRESSURE: 119 MMHG

## 2024-07-09 DIAGNOSIS — M25.50 POLYARTHRALGIA: ICD-10-CM

## 2024-07-09 DIAGNOSIS — N18.9 CHRONIC KIDNEY DISEASE, UNSPECIFIED CKD STAGE: Primary | ICD-10-CM

## 2024-07-09 DIAGNOSIS — B02.30 ZOSTER OPHTHALMICUS: Primary | ICD-10-CM

## 2024-07-09 PROCEDURE — 99283 EMERGENCY DEPT VISIT LOW MDM: CPT

## 2024-07-09 PROCEDURE — 99284 EMERGENCY DEPT VISIT MOD MDM: CPT

## 2024-07-09 RX ORDER — TETRACAINE HYDROCHLORIDE 5 MG/ML
1 SOLUTION OPHTHALMIC ONCE
Status: COMPLETED | OUTPATIENT
Start: 2024-07-09 | End: 2024-07-09

## 2024-07-09 NOTE — ED INITIAL ASSESSMENT (HPI)
Patient ambulatory to ED with complaint of shingles that spread to left eye.       Patient is AXOX4.

## 2024-07-09 NOTE — PROGRESS NOTES
Please relay to patient:  Hernan Atkinson, your kidney function is reduced I suspected it might be due from your chronic daily ibuprofen use. Please discontinue daily ibuprofen as I believe is damaging your kidney and worsening. Alternative for pain for now ok to take Tylenol 650mg every 6-8hrs PRN and also use Topical voltaren Gel Up to QID to affected joints.  I have placed a referral to Nephrology (Dr. Ritchie) kidney doctor and also Rheumatologist (for evaluation of multiple joint pain).   If any questions please schedule appointment and we can discuss but would like to see you after seeing specialist evaluation.   -

## 2024-07-09 NOTE — TELEPHONE ENCOUNTER
Pt should remain on valtrex for now, if can be seen by Ophthalmology same day (today) for urgent evaluation, if worsening visual changes warrants ER evaluation for Herpes zoster ophthalmicus if not go to ER for urgent Eval, and schedule TCM visit with me next week when I return

## 2024-07-09 NOTE — TELEPHONE ENCOUNTER
Per Dr. Natarajan, patient to complete 2 weeks of valtrex.  No further refills until he sees retinal specialist.  Patient states he is out of medications. Nurse discussed with Dr. Pandya who wanted the patient evaluated today.  Nurse advised patient to report to emergency room for possible IV treatment of herpes ophthalmicus.  Patient agreed and will go. Flagged for follow up.

## 2024-07-09 NOTE — TELEPHONE ENCOUNTER
Please reply to pool: EM RN TRIAGE  Action Requested: Summary for Provider     []  Critical Lab, Recommendations Needed  [x] Need Additional Advice  []   FYI    []   Need Orders  [x] Need Medications Sent to Pharmacy  []  Other     SUMMARY: Patient contacts clinic to update on facial shingles rash.  Seen by ophthalmologist 06/27 and placed on valtrex.  Seen also by Dr. Pandya the same date, rash was assessed, see pictures included in office note.  Patient states the rash has not spread, denies new blisters or drainage.  Lower lid swelling unchanged.  He has numbness and tingling above that eye and on his scalp which have been present since the rash was present. He does report blurred vision which he also reports is ongoing, referred to retinal specialist and will see specialist Thursday.  He is concerned that the rash is not completely resolved and inquires if he should continue valtrex.  Nurse reached out to Dr. Natarajan ophthalmologist and the staff will inquire about continuing valtrex.  Nurse awaiting call back from specialist and routed to provider for guidance.     Reason for call: Shingles  Onset: Data Unavailable       Yariel Natarajan MD - Ophthalmology - [place], [place] ...    [facility]  [email or web address]  [facility] [address]. [place] [address]. Get directions. Office: [phone]. Fax: [phone].                Reason for Disposition   Shingles rash of face and facial weakness    Protocols used: Shingles-A-OH

## 2024-07-10 NOTE — ED PROVIDER NOTES
Patient Seen in: [facility] Emergency Department      History     Chief Complaint   Patient presents with    Shingles    Eye Exam     Stated Complaint: shingles    Subjective:   HPI    Patient is a 69-year-old gentleman who was seen in the ER on June 23 and diagnosed with shingles.  There was concern about eye involvement.  He completed his 14-day course of Valtrex contacted his doctor because his left eye was still red and injected.  He has minimal pain.  He says his vision is okay.  He states at times it is blurred but when he blinks and rubs it it improves.  He contacted his physician today and was told to go to the emergency room.    Objective:   Past Medical History:    Congestive heart disease (HCC)    Pacemaker              Past Surgical History:   Procedure Laterality Date    Pacemaker/defibrillator  2012                Social History     Socioeconomic History    Marital status:    Tobacco Use    Smoking status: Former     Passive exposure: Never    Smokeless tobacco: Never    Tobacco comments:     Quit 1/11/2011 (day of MI), <1PPD (pack year 30 pack year). History.    Vaping Use    Vaping status: Never Used   Substance and Sexual Activity    Alcohol use: Never    Drug use: Never    Sexual activity: Yes     Partners: Female     Comment: Girlfriend long term   Other Topics Concern    Right Handed Yes              Review of Systems    Positive for stated Chief Complaint: Shingles and Eye Exam    Other systems are as noted in HPI.  Constitutional and vital signs reviewed.      All other systems reviewed and negative except as noted above.    Physical Exam     ED Triage Vitals [07/09/24 1820]   /79   Pulse 82   Resp 16   Temp 98.4 °F (36.9 °C)   Temp src Temporal   SpO2 100 %   O2 Device None (Room air)       Current Vitals:   Vital Signs  BP: 119/79  Pulse: 82  Resp: 16  Temp: 98.4 °F (36.9 °C)  Temp src: Temporal    Oxygen Therapy  SpO2: 100 %  O2 Device: None (Room  air)            Physical Exam    Gen: pt is alert, no obvious distress he is on his forehead and face of seem to improve.  Eyelids: nl inspection, no edema  Conjunctiva: injected on the left.  Cornea: No foreign body no abrasion noted.  No fluorescein uptake.  EOMI intact PERRLA  Ant chambers: nl inspection    ENT:  mmm, no lesions  Neck: supple, no LAD  Skin: warm and dry, no rash, no laceration      ED Course   Labs Reviewed - No data to display                   MDM      Use of independent historian:     I personally reviewed and interpreted the images :     No results found.    Vitals:    07/09/24 1820   BP: 119/79   Pulse: 82   Resp: 16   Temp: 98.4 °F (36.9 °C)   TempSrc: Temporal   SpO2: 100%   Weight: 88.5 kg   Height: 180.3 cm (5' 11\")     *I personally reviewed and interpreted all ED vitals.    Pulse Ox: 100%, Room air, Normal         Differential Diagnosis/ Diagnostic Considerations: Patient diagnosed with shingles.  He did have eye involvement.  He completed a course of Valtrex.  He still using a erythromycin ointment.  Still with injection and tearing.    Medical Record Review: I personally reviewed available prior medical records for any recent pertinent discharge summaries, testing, and procedures and reviewed those reports and found notes from follow-up visit with 6/27/2024 notes reviewed..    Complicating Factors: The patient already has recent shingles of face which contribute to the complexity of this ED evaluation.    Social determinants of health:    Prescription drug management:      Shared Decision Making:    ED Course: I did discuss case with Dr. Rader ophthalmology on-call who states he will see the patient tomorrow morning in his [place] office patient is in agreement with this plan    Discussion of management with other healthcare providers:    Condition upon leaving the department: Stable                                     Medical Decision Making      Disposition and Plan      Clinical Impression:  1. Zoster ophthalmicus         Disposition:  Discharge  7/9/2024  7:36 pm    Follow-up:  Vikash Rader MD  [id number] [facility] [address]  [phone]    Follow up      We recommend that you schedule follow up care with a primary care provider within the next three months to obtain basic health screening including reassessment of your blood pressure.      Medications Prescribed:  Current Discharge Medication List

## 2024-07-10 NOTE — ED QUICK NOTES
Patient presents to ED Pod 6 Turner from triage with c/o shingles to right eye. Patient states this has all started approx 2 weeks ago, was seen in ED and placed on medication. Patient states he was sent here today for IV steroids. Patient is A&O x 4. No distress noted. Respirations regular and unlabored. Call light at reach.

## 2024-07-10 NOTE — TELEPHONE ENCOUNTER
Rn called patient to deliver test results. He mentioned that he went to ER yesterday and the ER doctor contacted ophthalmology provider on call who said that he would see patient today. When patient called- they told him they would call him back.     ER note from Dr. Hero Genao- 7/9/24:    ED Course: \"I did discuss case with Dr. Rader ophthalmology on-call who states he will see the patient tomorrow morning in his [place] office patient is in agreement with this plan\"     Rn called Dr. Rader' office at [phone]- they are on lunch between 12:30 and 1:30.

## 2024-07-31 ENCOUNTER — MED REC SCAN ONLY (OUTPATIENT)
Dept: INTERNAL MEDICINE CLINIC | Facility: CLINIC | Age: 69
End: 2024-07-31

## 2024-07-31 ENCOUNTER — TELEPHONE (OUTPATIENT)
Dept: INTERNAL MEDICINE CLINIC | Facility: CLINIC | Age: 69
End: 2024-07-31

## 2024-07-31 NOTE — TELEPHONE ENCOUNTER
Records from Dr. Julianna Peraza (Neuro Ophthalmologist)  7/7/2024  Diplopia, Herpes Zoster Opthalmicus, retinal Vascular Attenuation both eyes, Nuclear Sclerosis both eyes, Dry Eye syndrome both eyes  Told to continue erythro opth at bedtime  PFAT QID, and artificial tears and close follow up DM and HTN.

## 2024-08-02 ENCOUNTER — MED REC SCAN ONLY (OUTPATIENT)
Dept: INTERNAL MEDICINE CLINIC | Facility: CLINIC | Age: 69
End: 2024-08-02

## 2025-03-23 NOTE — PROGRESS NOTES
OFFICE NOTE     Patient ID: Demetrio Rivera is a 69 year old male.  Today's Date: 06/27/24  Chief Complaint: ER F/U (Left eye/forehead shingles with blurry vision)      Pt is a 70y/o male New patient to our clinic with Extensive PMHx Ischemic Cardiomyopathy/Chronic combined systolic and diastolic heart failure and CAD s/p AFSHAN (in 2011)s/p pacemaker, former heavy smoker (30 pack year quit in 2011), Erectile dysfunction (well tolerated viagra 50mg),  statin intolerance,  Right right shoulder pain and polyarthralgia, here for ER follow up, annual physical/establish care.       Pt had rash and vision involvement over the weekend, and in ED for shingles outbreak.   Pt has not seen PCP in over 5 years, never had colonoscopy, never had PSA screening (does have LUTS 1-2 episodes urination at night. Does have ED on viagra 50mg well tolerated PRN     Pt with polyarthraliga and right shoulder pain. Pt reports on chronic ibuprofen daily for years    Followed by [place] [facility]ology (Dr. Shipley)    Recently seen in ED on 6/23/2024     Saw Ophthalmologist Dr. Tirado this am running low and sent valtrex and erythromycin      PCP: Dr. 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  BP Readings from Last 3 Encounters:   06/27/24 103/67   06/23/24 133/75   06/23/24 117/68     The ASCVD Risk score (Jada DK, et al., 2019) failed to calculate for the following reasons:    Cannot find a previous HDL lab    Cannot find a previous total cholesterol lab      Medications reviewed:  Current Outpatient Medications   Medication Sig Dispense Refill   • Sildenafil Citrate 50 MG Oral Tab Take 1 tablet (50 mg total) by mouth daily as needed for Erectile Dysfunction. 30 tablet 0   •  You can access the FollowMyHealth Patient Portal offered by [facility] by registering at the following website: [email or web address]. By joining [facility]’s FollowMyHealth portal, you will also be able to view your health information using other applications (apps) compatible with our system. valACYclovir 1 G Oral Tab Take 1 tablet (1,000 mg total) by mouth 3 (three) times daily for 7 days. 21 tablet 0   • erythromycin 5 MG/GM Ophthalmic Ointment Apply 1 Application to eye every 6 (six) hours for 7 days. 1 g 0   • ASPIRIN LOW DOSE 81 MG Oral Chew Tab      • carvedilol 25 MG Oral Tab      • clopidogrel 75 MG Oral Tab Take 1 tablet (75 mg total) by mouth daily.     • digoxin 0.125 MG Oral Tab      • ezetimibe 10 MG Oral Tab      • furosemide 40 MG Oral Tab      • pantoprazole 40 MG Oral Tab EC      • spironolactone 25 MG Oral Tab      • ENTRESTO  MG Oral Tab            Assessment & Plan    1. Annual physical exam (Primary)  -     GASTRO - INTERNAL  -     PSA, Total W Reflex To Free; Future; Expected date: 06/27/2024  -     Vitamin D; Future; Expected date: 06/27/2024  -     TSH W Reflex To Free T4; Future; Expected date: 06/27/2024  -     Lipid Panel; Future; Expected date: 06/27/2024  -     CBC With Differential With Platelet; Future; Expected date: 06/27/2024  -     Hemoglobin A1C; Future; Expected date: 06/27/2024  -     Comp Metabolic Panel (14); Future; Expected date: 06/27/2024  -     CT LUNG LD SCREENING(CPT=71271); Future; Expected date: 06/27/2024  Patient here for physical exam and due for following.  Plan:  -order the following Labs: CBC, CMP, Lipid Panel, A1C, TSH, Vitamin D, PSA  -follow up with established cards  -Referral to GI for screening colonoscopy   -Refer to [facility] for Pap smear/HPV screening  -Vaccines ordered in clinic (Prevnar,)     2. Ischemic cardiomyopathy  Comments:  Followed by [facility] (Dr. Shipley)  Overview:  Followed by [facility] (Dr. Shipley)  Plan  Chronic, well controlled on current medications, denies adverse effects, continue with present management.    3. Status post insertion of drug eluting coronary artery stent  Comments:  Followed by [facility] (Dr. Shipley)  Plan  Chronic, well controlled on current medications, denies adverse effects,  continue with present management.    4. S/P implantation of automatic cardioverter/defibrillator (AICD)  Comments:  Followed by [facility] (Dr. Shipley)  Plan  Chronic, well controlled on current medications, denies adverse effects, continue with present management.    5. Chronic combined systolic and diastolic heart failure (HCC)  Comments:  Followed by [facility] (Dr. Shipley)  Overview:  Followed by [facility] (Dr. Shipley)  Plan  Chronic, well controlled on current medications, denies adverse effects, continue with present management.    6. Actinic keratosis due to exposure to sunlight  Comments:  former [employer] business  (+30 years of sun exposure)  Orders:  -     Derm Referral - In Network  Pt would benefit from Derm evaluation and annual skin checks  Plan;  -derm referral placed    7. Rectal bleeding  Comments:  never had colonoscopy to date (as of 6/27/2027)  Orders:  -     GASTRO - INTERNAL  Pt never had colonoscopy, intermittent rectal bleeding on a off likely hemhrroids  Plan:  -refer for GI screening colonoscopy    8. Encounter for screening colonoscopy  -     GASTRO - INTERNAL  Pt never had colonoscopy, intermittent rectal bleeding on a off likely hemhrroids  Plan:  -refer for GI screening colonoscopy    9. Vitamin D deficiency  -     Vitamin D; Future; Expected date: 06/27/2024  Check vitamin D level, if low start supplementation    10. Former heavy tobacco smoker  Comments:  Pt with 30 pack year history quit in 2011 never had low dose CT scan prior to 2024  Overview:  Pt with 30 pack year history quit in 2011 never had low dose CT scan prior to 2024  Orders:  -     CT LUNG LD SCREENING(CPT=71271); Future; Expected date: 06/27/2024  Pt well overdue for low dose lung cancer screening and order    11. Statin intolerance  Comments:  statin intolerance due to myalgia on Zetia from Followed by [facility] (Dr. Shipley)  Continue on zetia alternative.     12.  Polyarthralgia  -     Uric Acid; Future; Expected date: 06/27/2024  -     C-Reactive Protein; Future; Expected date: 06/27/2024  -     Sed Rate, [place] (Automated); Future; Expected date: 06/27/2024  -     PHYSICAL THERAPY - INTERNAL  Pt with polyarthraliga ?possible gout or CPPD. Pt reports on chronic ibuprofen daily    Plan;  -check uric acid, CRP, ESRP  -refer to PT for evaluation/treatment  13. Chronic right shoulder pain  -     Uric Acid; Future; Expected date: 06/27/2024  -     C-Reactive Protein; Future; Expected date: 06/27/2024  -     Sed Rate, [place] (Automated); Future; Expected date: 06/27/2024  -     PHYSICAL THERAPY - INTERNAL  Refer to PT for evaluation    14. Herpes zoster with ophthalmic complication, unspecified herpes zoster eye disease  Comments:  Saw Ophthalmologist Dr. Tirado 6/27 on valtrex and erythromycin  Orders:  -     Derm Referral - In Network  Pt on valtrex and erythromycin seen by Optho Dr. Tirado today, was given referral to retinal specalist per pt  Plan;  -continue valtrex and management per optho  -follow up in 2 weeks to review rash  15. Erectile dysfunction, unspecified erectile dysfunction type  -     Sildenafil Citrate; Take 1 tablet (50 mg total) by mouth daily as needed for Erectile Dysfunction.  Dispense: 30 tablet; Refill: 0  Plan  Chronic, well controlled on current medications, denies adverse effects, continue with present management.          Follow Up: As needed/if symptoms worsen or Return in about 2 weeks (around 7/11/2024), or if symptoms worsen or fail to improve..   Objective/ Results:   Physical Exam  Constitutional:       Appearance: He is well-developed.   Cardiovascular:      Rate and Rhythm: Normal rate and regular rhythm.      Heart sounds: Normal heart sounds.   Pulmonary:      Effort: Pulmonary effort is normal.      Breath sounds: Normal breath sounds.   Abdominal:      General: Bowel sounds are normal.      Palpations: Abdomen is soft.    Musculoskeletal:      Right shoulder: Tenderness present. Decreased range of motion.   Skin:     General: Skin is warm and dry.   Neurological:      Mental Status: He is alert and oriented to person, place, and time.      Deep Tendon Reflexes: Reflexes are normal and symmetric.                        Reviewed:    Patient Active Problem List    Diagnosis   • Actinic keratosis due to exposure to sunlight   • Statin intolerance   • Ischemic cardiomyopathy     Followed by [facility] (Dr. Shipley)     • Status post insertion of drug eluting coronary artery stent   • S/P implantation of automatic cardioverter/defibrillator (AICD)   • Chronic combined systolic and diastolic heart failure (HCC)     Followed by [facility]ology (Dr. Shipley)     • Former heavy tobacco smoker     Pt with 30 pack year history quit in 2011 never had low dose CT scan prior to 2024     • Vitamin D deficiency      No Known Allergies     Social History     Socioeconomic History   • Marital status:    Tobacco Use   • Smoking status: Former     Passive exposure: Never   • Smokeless tobacco: Never   • Tobacco comments:     Quit 1/11/2011 (day of MI), <1PPD (pack year 30 pack year). History.    Vaping Use   • Vaping status: Never Used   Substance and Sexual Activity   • Alcohol use: Never   • Drug use: Never   • Sexual activity: Yes     Partners: Female     Comment: Girlfriend long term   Other Topics Concern   • Right Handed Yes      Review of Systems   Constitutional:  Positive for fatigue. Negative for fever.   Eyes:  Positive for photophobia, discharge, redness, itching and visual disturbance. Negative for pain.   Skin:  Positive for rash and wound.     All other systems negative unless otherwise stated in ROS or HPI above.       Quoc Pandya MD  Internal Medicine       Call office with any questions or seek emergency care if necessary.   Patient understands and agrees to follow directions and  advice.      ----------------------------------------- PATIENT INSTRUCTIONS-----------------------------------------     There are no Patient Instructions on file for this visit.      The 21st Century Cures Act makes medical notes available to patients in the interest of transparency.  However, please be advised that this is a medical document.  It is intended as a peer to peer communication.  It is written in medical language and may contain abbreviations or verbiage that are technical and unfamiliar.  It may appear blunt or direct.  Medical documents are intended to carry relevant information, facts as evident, and the clinical opinion of the practitioner.

## (undated) NOTE — ED AVS SNAPSHOT
[facility] Emergency Department    [place] [address]     [place] [address]    Phone:  [phone]    Fax:  [phone]           Jaguar Myers   MRN: [id number]    Department:  [facility] Emergency Department   Date of Visit:  2/25/2 and Class Registration line at [phone] or find a doctor online by visiting [email or web address].    IF THERE IS ANY CHANGE OR WORSENING OF YOUR CONDITION, CALL YOUR PRIMARY CARE PHYSICIAN AT ONCE OR RETURN IMMEDIATELY TO [address] [place] [address].     If

## (undated) NOTE — ED AVS SNAPSHOT
[facility] Emergency Department    [address] [address]     [place] [address]    Phone:  [phone]    Fax:  [phone]           Nora Kinney   MRN: [id number]    Department:  [facility] Emergency Department   Date of Visit:  2/23/2 and Class Registration line at [phone] or find a doctor online by visiting [email or web address].    IF THERE IS ANY CHANGE OR WORSENING OF YOUR CONDITION, CALL YOUR PRIMARY CARE PHYSICIAN AT ONCE OR RETURN IMMEDIATELY TO [address] [place] [address].     If

## (undated) NOTE — ED AVS SNAPSHOT
[facility] Emergency Department    [address]     [place] [address]    Phone:  [phone]    Fax:  [phone]           Ernestina Dorothy   MRN: [id number]    Department:  [facility] Emergency Department   Date of Visit:  2/23/2 covered by your plan. Please contact your insurance company to determine coverage and benefits available for follow-up care and referrals.       If you have difficulty scheduling your follow-up appointment as directed, please call our  at [phone]) If you believe that any of the medications or instructions on this list is different from what your Primary Care doctor has instructed you - please continue to take your medications as instructed by your Primary Care doctor until you can check with your do coverage. Patient [address] is a Federal Navigator program that can help with your Affordable Care Act coverage, as well as all types of Medicaid plans.   To get signed up and covered, please call [phone] and ask to get set up for an insuran

## (undated) NOTE — ED AVS SNAPSHOT
[facility] Emergency Department    [address]     [place] [address]    Phone:  [phone]    Fax:  [phone]           Chely Joe   MRN: [id number]    Department:  [facility] Emergency Department   Date of Visit:  2/25/2 Take 1-2 tablets by mouth every 6 (six) hours as needed for Pain. predniSONE 20 MG Tabs   Quantity:  10 tablet   Commonly known as:  DELTASONE   Take 2 tablets (40 mg total) by mouth daily.             Where to Get Your Medications      You can get th [facility] Emergency Department. Follow-up care is at the discretion of that Physician.   If you need additional assistance selecting a physician, you may call the [employer] Physician Referral and Class Registration line at [phone] [facility] [address]  [place] [address]) [phone]                Additional Information       We are concerned for your overall well being:    - If you are a smoker or have smoked in the last 12 months, we encourage you to explore op